# Patient Record
Sex: FEMALE | Race: BLACK OR AFRICAN AMERICAN | NOT HISPANIC OR LATINO | URBAN - METROPOLITAN AREA
[De-identification: names, ages, dates, MRNs, and addresses within clinical notes are randomized per-mention and may not be internally consistent; named-entity substitution may affect disease eponyms.]

---

## 2017-01-24 ENCOUNTER — HOSPITAL ENCOUNTER (OUTPATIENT)
Dept: CT IMAGING | Facility: CLINIC | Age: 77
Discharge: HOME | End: 2017-01-24
Attending: FAMILY MEDICINE

## 2017-01-24 ENCOUNTER — OFFICE VISIT - HEALTHEAST (OUTPATIENT)
Dept: FAMILY MEDICINE | Facility: CLINIC | Age: 77
End: 2017-01-24

## 2017-01-24 DIAGNOSIS — R10.32 ABDOMINAL PAIN, LLQ: ICD-10-CM

## 2017-01-24 DIAGNOSIS — N20.0 NEPHROLITHIASIS: ICD-10-CM

## 2017-01-26 ENCOUNTER — OFFICE VISIT - HEALTHEAST (OUTPATIENT)
Dept: UROLOGY | Facility: CLINIC | Age: 77
End: 2017-01-26

## 2017-01-26 DIAGNOSIS — N20.1 CALCULUS OF URETER: ICD-10-CM

## 2017-01-26 DIAGNOSIS — N20.9 URINARY CALCULUS, UNSPECIFIED: ICD-10-CM

## 2017-01-26 DIAGNOSIS — N20.0 CALCULUS OF KIDNEY: ICD-10-CM

## 2017-02-03 ENCOUNTER — OFFICE VISIT - HEALTHEAST (OUTPATIENT)
Dept: UROLOGY | Facility: CLINIC | Age: 77
End: 2017-02-03

## 2017-02-03 ENCOUNTER — HOSPITAL ENCOUNTER (OUTPATIENT)
Dept: CT IMAGING | Facility: CLINIC | Age: 77
Discharge: HOME | End: 2017-02-03
Attending: PHYSICIAN ASSISTANT

## 2017-02-03 DIAGNOSIS — N20.1 CALCULUS OF URETER: ICD-10-CM

## 2017-02-03 DIAGNOSIS — N20.9 URINARY CALCULUS, UNSPECIFIED: ICD-10-CM

## 2017-02-03 DIAGNOSIS — N20.0 CALCULUS OF KIDNEY: ICD-10-CM

## 2017-02-10 ENCOUNTER — ANESTHESIA - HEALTHEAST (OUTPATIENT)
Dept: SURGERY | Facility: CLINIC | Age: 77
End: 2017-02-10

## 2017-02-10 ENCOUNTER — SURGERY - HEALTHEAST (OUTPATIENT)
Dept: SURGERY | Facility: CLINIC | Age: 77
End: 2017-02-10

## 2017-02-10 ASSESSMENT — MIFFLIN-ST. JEOR: SCORE: 1090.32

## 2017-02-16 ENCOUNTER — AMBULATORY - HEALTHEAST (OUTPATIENT)
Dept: UROLOGY | Facility: CLINIC | Age: 77
End: 2017-02-16

## 2017-02-16 DIAGNOSIS — N20.9 URINARY CALCULUS, UNSPECIFIED: ICD-10-CM

## 2017-02-16 DIAGNOSIS — N20.0 CALCULUS OF KIDNEY: ICD-10-CM

## 2017-02-16 DIAGNOSIS — N20.1 CALCULUS OF URETER: ICD-10-CM

## 2017-02-17 ENCOUNTER — HOSPITAL ENCOUNTER (OUTPATIENT)
Dept: ADMINISTRATIVE | Facility: OTHER | Age: 77
Discharge: HOME | End: 2017-02-17

## 2017-02-20 ENCOUNTER — COMMUNICATION - HEALTHEAST (OUTPATIENT)
Dept: UROLOGY | Facility: CLINIC | Age: 77
End: 2017-02-20

## 2017-02-20 DIAGNOSIS — B95.2 UTI (URINARY TRACT INFECTION) DUE TO ENTEROCOCCUS: ICD-10-CM

## 2017-02-20 DIAGNOSIS — N39.0 UTI (URINARY TRACT INFECTION) DUE TO ENTEROCOCCUS: ICD-10-CM

## 2017-02-24 ENCOUNTER — COMMUNICATION - HEALTHEAST (OUTPATIENT)
Dept: UROLOGY | Facility: CLINIC | Age: 77
End: 2017-02-24

## 2017-02-24 DIAGNOSIS — B95.2 UTI (URINARY TRACT INFECTION) DUE TO ENTEROCOCCUS: ICD-10-CM

## 2017-02-24 DIAGNOSIS — N39.0 UTI (URINARY TRACT INFECTION) DUE TO ENTEROCOCCUS: ICD-10-CM

## 2017-06-30 ENCOUNTER — HOME CARE/HOSPICE - HEALTHEAST (OUTPATIENT)
Dept: HOME HEALTH SERVICES | Facility: HOME HEALTH | Age: 77
End: 2017-06-30

## 2017-07-04 ENCOUNTER — COMMUNICATION - HEALTHEAST (OUTPATIENT)
Dept: HOME HEALTH SERVICES | Facility: HOME HEALTH | Age: 77
End: 2017-07-04

## 2017-07-06 ENCOUNTER — HOME CARE/HOSPICE - HEALTHEAST (OUTPATIENT)
Dept: HOME HEALTH SERVICES | Facility: HOME HEALTH | Age: 77
End: 2017-07-06

## 2017-07-06 ENCOUNTER — COMMUNICATION - HEALTHEAST (OUTPATIENT)
Dept: FAMILY MEDICINE | Facility: CLINIC | Age: 77
End: 2017-07-06

## 2017-07-07 ENCOUNTER — OFFICE VISIT - HEALTHEAST (OUTPATIENT)
Dept: FAMILY MEDICINE | Facility: CLINIC | Age: 77
End: 2017-07-07

## 2017-07-07 DIAGNOSIS — I63.511 CEREBRAL INFARCTION INVOLVING RIGHT MIDDLE CEREBRAL ARTERY (H): ICD-10-CM

## 2017-07-08 ENCOUNTER — HOME CARE/HOSPICE - HEALTHEAST (OUTPATIENT)
Dept: HOME HEALTH SERVICES | Facility: HOME HEALTH | Age: 77
End: 2017-07-08

## 2017-07-10 ENCOUNTER — COMMUNICATION - HEALTHEAST (OUTPATIENT)
Dept: HOME HEALTH SERVICES | Facility: HOME HEALTH | Age: 77
End: 2017-07-10

## 2017-07-11 ENCOUNTER — HOME CARE/HOSPICE - HEALTHEAST (OUTPATIENT)
Dept: HOME HEALTH SERVICES | Facility: HOME HEALTH | Age: 77
End: 2017-07-11

## 2017-07-14 ENCOUNTER — HOME CARE/HOSPICE - HEALTHEAST (OUTPATIENT)
Dept: HOME HEALTH SERVICES | Facility: HOME HEALTH | Age: 77
End: 2017-07-14

## 2017-07-18 ENCOUNTER — HOME CARE/HOSPICE - HEALTHEAST (OUTPATIENT)
Dept: HOME HEALTH SERVICES | Facility: HOME HEALTH | Age: 77
End: 2017-07-18

## 2017-07-21 ENCOUNTER — HOME CARE/HOSPICE - HEALTHEAST (OUTPATIENT)
Dept: HOME HEALTH SERVICES | Facility: HOME HEALTH | Age: 77
End: 2017-07-21

## 2017-07-21 ENCOUNTER — COMMUNICATION - HEALTHEAST (OUTPATIENT)
Dept: FAMILY MEDICINE | Facility: CLINIC | Age: 77
End: 2017-07-21

## 2017-07-21 DIAGNOSIS — I63.511 CEREBRAL INFARCTION INVOLVING RIGHT MIDDLE CEREBRAL ARTERY (H): ICD-10-CM

## 2017-07-25 ENCOUNTER — RECORDS - HEALTHEAST (OUTPATIENT)
Dept: ADMINISTRATIVE | Facility: OTHER | Age: 77
End: 2017-07-25

## 2017-08-04 ENCOUNTER — COMMUNICATION - HEALTHEAST (OUTPATIENT)
Dept: OBGYN | Facility: HOSPITAL | Age: 77
End: 2017-08-04

## 2017-08-04 DIAGNOSIS — I63.511 CEREBRAL INFARCTION INVOLVING RIGHT MIDDLE CEREBRAL ARTERY (H): ICD-10-CM

## 2017-08-11 ENCOUNTER — OFFICE VISIT - HEALTHEAST (OUTPATIENT)
Dept: FAMILY MEDICINE | Facility: CLINIC | Age: 77
End: 2017-08-11

## 2017-08-11 ENCOUNTER — RECORDS - HEALTHEAST (OUTPATIENT)
Dept: GENERAL RADIOLOGY | Facility: CLINIC | Age: 77
End: 2017-08-11

## 2017-08-11 ENCOUNTER — COMMUNICATION - HEALTHEAST (OUTPATIENT)
Dept: FAMILY MEDICINE | Facility: CLINIC | Age: 77
End: 2017-08-11

## 2017-08-11 DIAGNOSIS — M54.2 CERVICALGIA: ICD-10-CM

## 2017-08-11 DIAGNOSIS — M19.90 UNSPECIFIED OSTEOARTHRITIS, UNSPECIFIED SITE: ICD-10-CM

## 2017-08-11 DIAGNOSIS — M54.2 CERVICAL PAIN: ICD-10-CM

## 2017-08-11 DIAGNOSIS — M16.10 HIP ARTHRITIS: ICD-10-CM

## 2017-08-11 DIAGNOSIS — F32.9 REACTIVE DEPRESSION: ICD-10-CM

## 2017-08-11 RX ORDER — TRAMADOL HYDROCHLORIDE 50 MG/1
50 TABLET ORAL 2 TIMES DAILY PRN
Qty: 60 TABLET | Refills: 0 | Status: SHIPPED | OUTPATIENT
Start: 2017-08-11

## 2017-08-15 ENCOUNTER — COMMUNICATION - HEALTHEAST (OUTPATIENT)
Dept: FAMILY MEDICINE | Facility: CLINIC | Age: 77
End: 2017-08-15

## 2017-08-30 ENCOUNTER — OFFICE VISIT - HEALTHEAST (OUTPATIENT)
Dept: FAMILY MEDICINE | Facility: CLINIC | Age: 77
End: 2017-08-30

## 2017-08-30 DIAGNOSIS — F51.01 PRIMARY INSOMNIA: ICD-10-CM

## 2017-08-30 RX ORDER — LANOLIN ALCOHOL/MO/W.PET/CERES
3 CREAM (GRAM) TOPICAL
Qty: 30 TABLET | Refills: 1 | Status: SHIPPED | COMMUNITY
Start: 2017-08-30

## 2017-08-30 ASSESSMENT — MIFFLIN-ST. JEOR: SCORE: 1095.03

## 2017-11-08 ENCOUNTER — COMMUNICATION - HEALTHEAST (OUTPATIENT)
Dept: FAMILY MEDICINE | Facility: CLINIC | Age: 77
End: 2017-11-08

## 2017-11-08 DIAGNOSIS — I63.511 CEREBRAL INFARCTION INVOLVING RIGHT MIDDLE CEREBRAL ARTERY (H): ICD-10-CM

## 2017-11-08 RX ORDER — SIMVASTATIN 40 MG
40 TABLET ORAL AT BEDTIME
Qty: 90 TABLET | Refills: 0 | Status: SHIPPED | OUTPATIENT
Start: 2017-11-08

## 2017-11-30 ENCOUNTER — COMMUNICATION - HEALTHEAST (OUTPATIENT)
Dept: FAMILY MEDICINE | Facility: CLINIC | Age: 77
End: 2017-11-30

## 2017-11-30 DIAGNOSIS — F32.9 REACTIVE DEPRESSION: ICD-10-CM

## 2017-11-30 DIAGNOSIS — M54.2 CERVICAL PAIN: ICD-10-CM

## 2017-12-02 RX ORDER — DULOXETIN HYDROCHLORIDE 30 MG/1
30 CAPSULE, DELAYED RELEASE ORAL DAILY
Qty: 30 CAPSULE | Refills: 3 | Status: SHIPPED | OUTPATIENT
Start: 2017-12-02

## 2018-01-04 ENCOUNTER — COMMUNICATION - HEALTHEAST (OUTPATIENT)
Dept: OBGYN | Facility: HOSPITAL | Age: 78
End: 2018-01-04

## 2018-01-04 DIAGNOSIS — I63.511 CEREBRAL INFARCTION INVOLVING RIGHT MIDDLE CEREBRAL ARTERY (H): ICD-10-CM

## 2018-01-04 RX ORDER — LISINOPRIL 5 MG/1
TABLET ORAL
Qty: 30 TABLET | Refills: 7 | Status: SHIPPED | OUTPATIENT
Start: 2018-01-04

## 2021-05-30 VITALS — HEIGHT: 62 IN | BODY MASS INDEX: 27.06 KG/M2 | WEIGHT: 147.06 LBS

## 2021-05-30 VITALS — BODY MASS INDEX: 26.94 KG/M2 | WEIGHT: 147.3 LBS

## 2021-05-31 VITALS — HEIGHT: 61 IN | BODY MASS INDEX: 28.62 KG/M2 | WEIGHT: 151.6 LBS

## 2021-05-31 VITALS — BODY MASS INDEX: 29.57 KG/M2 | WEIGHT: 151.4 LBS

## 2021-05-31 VITALS — BODY MASS INDEX: 29.04 KG/M2 | WEIGHT: 148.7 LBS

## 2021-06-08 NOTE — ANESTHESIA POSTPROCEDURE EVALUATION
Patient: Sandy Pennington  CYSTOSCOPY, BILATERAL URETEROSCOPY, BILATERAL URETERAL STENT INSERTION  Anesthesia type: general    Patient location: Phase II Recovery  Last vitals:   Vitals:    02/10/17 1100   BP: (!) 190/74   Pulse: 74   Resp: 16   Temp:    SpO2: 95%     Post vital signs: stable  Level of consciousness: awake, alert and oriented  Post-anesthesia pain: pain controlled  Post-anesthesia nausea and vomiting: no  Pulmonary: unassisted, return to baseline  Cardiovascular: stable and blood pressure at baseline  Hydration: adequate  Anesthetic events: no    QCDR Measures:  ASA# 11 - Elenita-op Cardiac Arrest: ASA11B - Patient did NOT experience unanticipated cardiac arrest  ASA# 12 - Elenita-op Mortality Rate: ASA12B - Patient did NOT die  ASA# 13 - PACU Re-Intubation Rate: ASA13B - Patient did NOT require a new airway mgmt  ASA# 10 - Composite Anes Safety: ASA10A - No serious adverse event  ASA# 38 - New Corneal Injury: ASA38A - No new exposure keratitis or corneal abrasion in PACU    Additional Notes:

## 2021-06-08 NOTE — PROGRESS NOTES
Patient educated regarding stent removal procedure and possible symptoms after removal.  Patient voiced understanding of information.  Handout given to patient.  Consent form signed.  Daughter is the .

## 2021-06-08 NOTE — ANESTHESIA CARE TRANSFER NOTE
Last vitals:   Vitals:    02/10/17 0946   BP: 136/60   Pulse: 90   Resp: 16   Temp: 37  C (98.6  F)   SpO2: 100%     Patient's level of consciousness is drowsy  Spontaneous respirations: yes  Maintains airway independently: yes  Dentition unchanged: yes  Oropharynx: oral airway in place    QCDR Measures:  ASA# 20 - Surgical Safety Checklist: ASA20A - Safety Checks Done  PQRS# 430 - Adult PONV Prevention: 4558F - Pt received => 2 anti-emetic agents (different classes) preop & intraop  ASA# 8 - Peds PONV Prevention: NA - Not pediatric patient, not GA or 2 or more risk factors NOT present  PQRS# 424 - Elenita-op Temp Management: 4559F - At least one body temp DOCUMENTED => 35.5C or 95.9F within required timeframe  PQRS# 426 - PACU Transfer Protocol: - Transfer of care checklist used  ASA# 14 - Acute Post-op Pain: ASA14B - Patient did NOT experience pain >= 7 out of 10    I completed my SBAR handoff to the receiving nurse per policy and procedure.

## 2021-06-08 NOTE — PROGRESS NOTES
Assessment/Plan:        Diagnoses and all orders for this visit:    Urinary calculus, unspecified  -     POCT urinalysis dipstick-[facility]  -     Ureteroscopy Education  -     Place sequential compression device; Standing  -     XR Abdomen AP; Standing  -     levofloxacin 500 mg/100 mL IVPB 500 mg (LEVAQUIN); Infuse 100 mL (500 mg total) into a venous catheter 60 minutes before surgery or procedure (On Call to OR).    Calculus of ureter  -     tamsulosin (FLOMAX) 0.4 mg Cp24; Take 1 capsule (0.4 mg total) by mouth daily for 14 days.  Dispense: 14 capsule; Refill: 1  -     Ureteroscopy Education  -     Place sequential compression device; Standing  -     XR Abdomen AP; Standing  -     levofloxacin 500 mg/100 mL IVPB 500 mg (LEVAQUIN); Infuse 100 mL (500 mg total) into a venous catheter 60 minutes before surgery or procedure (On Call to OR).    Calculus of kidney  -     Ureteroscopy Education  -     Place sequential compression device; Standing  -     XR Abdomen AP; Standing  -     levofloxacin 500 mg/100 mL IVPB 500 mg (LEVAQUIN); Infuse 100 mL (500 mg total) into a venous catheter 60 minutes before surgery or procedure (On Call to OR).      Stone Management Plan  [facility] 1/26/2017 2/3/2017   Urinary Tract Infection No suspicion of infection No suspicion of infection   Renal Colic Well controlled symptoms Asymptomatic at this time   Renal Failure No suspicion of renal failure No suspicion of renal failure   Current CT date 1/24/2017 2/3/2017   Right sided stones? Yes Yes   R Number of ureteral stones 1 1   R GSD of ureteral stones 5 5   R Location of ureteral stone Distal Distal   R Number of kidney stones  No renal stones 3   R GSD of kidney stones N/A < 2   R Hydronephrosis None None   R Stone Event New event Established event   Diagnosis date 1/24/2017 -   Initial location of primary symptomatic stone Distal -   Initial GSD of primary symptomatic stone 5 -   R MET status Initiation No progression    R Current Plan MET Clear   MET 1 week F/U -   Clear rationale - Poor prognosis   Left sided stones? Yes Yes   L Number of ureteral stones No ureteral stones No ureteral stones   L Number of kidney stones  3 3   L GSD of kidney stones 4 - 10 4 - 10   L Hydronephrosis None None   L Stone Event No current event No current event   L Current Plan Observe Clear   Clear rationale - Optimally managed electively   Observe rationale Significant stone burden amenable to emergency management -             Subjective:      HPI  Ms. Sandy Pennington is a 76 y.o.  female from [place] returning to the [facility] [place] for follow-up of right distal ureteral stone with known 31-2 mm stones in the right kidney and 3 large stones in the left kidney measuring 3 mm 4 mm and 6 mm.  Patient has been completely asymptomatic since seen 1/26/2017.  Specifically no voiding symptoms of urgency or frequency or hematuria no abdominal or flank pain.    Her original CT was done January 24, 2017.  [place] numbers of large stones were 936.    Personal review of CT scan obtained today to 3/2/ 2017 demonstrates a 5.3 x 4.7 calculus in the region of the right ureteral meatus versus an bladder but absolutely unchanged from previous location.  Upper tract stones right and left are unchanged from CT of 1/24/2017.  There is no hydroureteronephrosis.    Impression right distal ureteral stone versus stone and bladder with bilateral renal stones    Plan recommended cystoscopy with anesthesia with cystolitholapaxy of stone if in bladder.  If stone is in the right distal ureter proceed with ureteroscopy laser lithotripsy removal of stone and then consideration for clearance of upper tract stones with cystoscopy retrograde ureteroscopy stone removal  stent placement.  Consideration could then be given to clearance on the left with cystoscopy left retrograde ureteroscopy laser lithotripsy stone removal with stent  placement.  If the right sided distal stone is actually in the bladder clearance of the stone will be done and then consideration made for clearance of stones on the left initially and if all went well proceeded on the right.  This would be at the discretion of the  Dr. Epps.    Risks and benefits were detailed and ureteroscopic stone clearance including potential issues of urinary or systemic infection ureteral injury and accessible stone incomplete stone clearance multiple surgeries and stent related symptoms of flank pain and bladder pain urgency frequency and hematuria.  Patient verbalized understanding and agreed with plan as discussed.    She will remain on her Flomax at this point with surgery scheduled for February 10, 2017.    Patient has available to her Flomax, Percocet ibuprofen and Dramamine for symptom relief postoperatively.  We discussed briefly consideration for stone risk evaluation upon resolution of her acute problem.    Greater than 40 minutes spent with patient and her  discussing evaluation and recommendation greater than 50% of the time spent counseling patient     ROS   A 12 point comprehensive review of systems is negative except for HPI.    Past Medical History:   Diagnosis Date     Arthritis      GERD (gastroesophageal reflux disease)      Kidney stone        Past Surgical History:   Procedure Laterality Date     APPENDECTOMY         Current Outpatient Prescriptions   Medication Sig Dispense Refill     acetaminophen (TYLENOL) 325 MG tablet Take 650 mg by mouth every 6 (six) hours as needed for pain.       acetaminophen-codeine (TYLENOL #3) 300-30 mg per tablet Take 1 tablet by mouth every 4 (four) hours as needed for pain. 30 tablet 0     ibuprofen (ADVIL,MOTRIN) 200 MG tablet Take 200 mg by mouth every 6 (six) hours as needed for pain.       omeprazole (PRILOSEC) 20 MG capsule Take 1 capsule (20 mg total) by mouth Daily before breakfast. 30 capsule 0      oxyCODONE-acetaminophen (PERCOCET) 5-325 mg per tablet 1 tab po q4hrs prn pain 20 tablet 0     ranitidine (ZANTAC) 300 MG tablet Take 1 tablet (300 mg total) by mouth bedtime. 30 tablet 2     senna-docusate (SENNOSIDES-DOCUSATE SODIUM) 8.6-50 mg tablet Take 1 tablet by mouth daily. 20 tablet 0     tamsulosin (FLOMAX) 0.4 mg Cp24 Take 1 capsule (0.4 mg total) by mouth daily for 14 days. 14 capsule 1     No current facility-administered medications for this visit.        No Known Allergies    Social History     Social History     Marital status:      Spouse name: N/A     Number of children: N/A     Years of education: N/A     Occupational History     Retired      Social History Main Topics     Smoking status: Never Smoker     Smokeless tobacco: Never Used     Alcohol use No     Drug use: No     Sexual activity: Not Currently     Other Topics Concern     Not on file     Social History Narrative       Family History   Problem Relation Age of Onset     Urolithiasis Neg Hx      Clotting disorder Neg Hx      Diabetes Neg Hx      Gout Neg Hx      Cancer Neg Hx      Heart disease Neg Hx      Objective:      Physical Exam  Vitals:    02/03/17 1447   BP: 177/79   Pulse: 77   Temp:      General - well developed, well nourished, appropriate for age. Appears no distress at this time   Heart - regular rate and rhythm, no murmur  Respiratory - normal effort, clear to auscultation, good air entry without adventitious noises  Abdomen - mildly obese soft, non-tender, no hepatosplenomegaly, no masses.   - no flank tenderness, no suprapubic tenderness, kidney and bladder non-palpable  MSK - normal spinal curvature. no spinal tenderness. normal gait. muscular strength intact.  Neurology - cranial nerves II-XII grossly intact, normal sensation, no unsteadiness  Skin - intact, no bruising, no gouty tophi  Psych - oriented to time, place, and person, normal mood and affect.      Labs   Urinalysis POC (Office):  BLOOD UA   Date  Value Ref Range Status   02/03/2017 Trace Negative Final     NITRITE, UA   Date Value Ref Range Status   02/03/2017 Negative Negative Final   01/09/2017 Negative Negative Final   06/13/2016 Negative Negative Final     LEUKOCYTES, UA    Date Value Ref Range Status   02/03/2017 Negative Negative Final     PH UA   Date Value Ref Range Status   02/03/2017 6.5 4.5 - 8.0 Final       Lab Urinalysis:  BLOOD, UA   Date Value Ref Range Status   01/09/2017 Negative Negative Final   06/13/2016 Trace (!) Negative Final     NITRITE, UA   Date Value Ref Range Status   02/03/2017 Negative Negative Final   01/09/2017 Negative Negative Final   06/13/2016 Negative Negative Final     LEUKOCYTES, UA   Date Value Ref Range Status   01/09/2017 Small (!) Negative Final   06/13/2016 Trace (!) Negative Final     PH, UA   Date Value Ref Range Status   01/09/2017 7.0 4.5 - 8.0 Final   06/13/2016 5.5 4.5 - 8.0 Final

## 2021-06-08 NOTE — PROGRESS NOTES
ASSESSMENT:  1. Abdominal pain, LLQ  - CT Abdomen Pelvis Without Oral Without IV Contrast; Future  - acetaminophen-codeine (TYLENOL #3) 300-30 mg per tablet; Take 1 tablet by mouth every 4 (four) hours as needed for pain.  Dispense: 30 tablet; Refill: 0  - omeprazole (PRILOSEC) 20 MG capsule; Take 1 capsule (20 mg total) by mouth Daily before breakfast.  Dispense: 30 capsule; Refill: 0  - senna-docusate (SENNOSIDES-DOCUSATE SODIUM) 8.6-50 mg tablet; Take 1 tablet by mouth daily.  Dispense: 20 tablet; Refill: 0    2. Nephrolithiasis  - Ambulatory referral to Kidney Stone    PLAN:  There are no Patient Instructions on file for this visit.    Orders Placed This Encounter   Procedures     CT Abdomen Pelvis Without Oral Without IV Contrast     Standing Status:   Future     Number of Occurrences:   1     Standing Expiration Date:   1/24/2018     Order Specific Question:   Reason for Exam (Describe Symptoms):     Answer:   Right Lower Quadrant Abdominal pain     Order Specific Question:   Can the procedure be changed per Radiologist protocol?     Answer:   Yes     Order Specific Question:   If this is a diagnostic procedure, have the patient's age and recent imaging history been considered?     Answer:   Yes     Ambulatory referral to Kidney Stone     Referral Priority:   Routine     Referral Type:   Consultation     Referral Reason:   Evaluation and Treatment     Requested Specialty:   Urology     Number of Visits Requested:   1     There are no discontinued medications.    No Follow-up on file.     Prescribed Tylenol #3 and senna-docusate for pain and constipation. Prescribed omeprazole to take in the morning, and advised patient to take ranitidine at night as needed. Advised patient not to take ibuprofen. Encouraged patient to eat 3 times per day and to drink enough water. Patient should include fruits and vegetables in her diet. Ordered an Abdomen Pelvic CT to evaluate abdominal pain as well.     CHIEF  COMPLAINT:  Chief Complaint   Patient presents with     Pain     all over body pain, started yesterday, had gone to ER for Chest pain (which is better now)       HISTORY OF PRESENT ILLNESS:  Sandy is a 76 y.o. female presenting to the clinic today with her daughter for evaluation of pain. She has had pain all over her body for about 1 month. The pain comes and goes. She has pain at her head, neck, chest, back, stomach, and groin pain that goes down her right leg. She has daily bowel movements. She has constipation at times and diarrhea at times. She has not vomited. She does not eat or drink much. She does not feel worried. She used to go for walks during the summer according to her daughter, but she has not been going for walks since the weather became colder. Of note, she presented to the [facility] ER on January 9 with headache, chest pain, and shortness of breath, and the symptoms have since improved.     REVIEW OF SYSTEMS:   She has a cough that is not productive. All other systems are negative.    PFSH:  Social: She has been in [place] for 1 year and 2 months.     Reviewed, as below.     TOBACCO USE:  History   Smoking Status     Never Smoker   Smokeless Tobacco     Never Used       VITALS:  Vitals:    01/24/17 1422   BP: 138/72   Patient Site: Left Arm   Patient Position: Sitting   Cuff Size: Adult Regular   Pulse: 76   Temp: 98.2  F (36.8  C)   TempSrc: Oral   Weight: 147 lb 4.8 oz (66.8 kg)     Wt Readings from Last 3 Encounters:   01/24/17 147 lb 4.8 oz (66.8 kg)   01/09/17 160 lb (72.6 kg)   09/30/16 142 lb 4.8 oz (64.5 kg)     Body mass index is 26.94 kg/(m^2).    PHYSICAL EXAM:  General Appearance: Alert, cooperative, no distress, appears stated age. Looks a little downcast. Afebrile.   HEENT: Pupils equal, symmetric  Lungs: Clear to auscultation bilaterally, respirations unlabored  Heart: Regular rate and rhythm, S1 and S2 normal, no murmur, rub, or gallop  Abdomen: Soft, bowel sounds active all  "four quadrants, no masses, no organomegaly. Diffuse abdominal pain noted more around the flanks. Right lower abdominal discomfort, more around the right pelvic region. Also some left upper quadrant abdominal pain.  Neurologic:  Alert and oriented times 3. Normal reflexes. Cranial nerves II-XII intact.   Psychiatric: Normal mood and affect.      QUALITY MEASURES  The following high BMI interventions were performed this visit: encouragement to exercise      ADDITIONAL HISTORY SUMMARIZED (2): Reviewed 1/9/2017 note from [facility] ER regarding headache and chest pain.   DECISION TO OBTAIN EXTRA INFORMATION (1): None.   RADIOLOGY TESTS (1): Ordered Abdomen Pelvic CT. Reviewed 6/13/2016 Abdominal X-ray report, indication: Unspecified abdominal pain. Reviewed 1/9/2017 Cervical Spine CT report, indication: \"Worsening chronic headache. Neck pain.\"   LABS (1): Reviewed 1/9/2016 labs from [facility] ED including CMP.   MEDICINE TESTS (1): None.  INDEPENDENT REVIEW (2 each): None.     The visit lasted a total of 32 minutes face to face with the patient. Over 50% of the time was spent counseling and educating the patient about pain. An additional 1 minute was spent on exercise encouragement.     IWaqas, am scribing for and in the presence of, Dr. Lundberg.    I, Dr. Lundberg, personally performed the services described in this documentation, as scribed by Waqas Lopez in my presence, and it is both accurate and complete.    MEDICATIONS:  Current Outpatient Prescriptions   Medication Sig Dispense Refill     ranitidine (ZANTAC) 300 MG tablet Take 1 tablet (300 mg total) by mouth bedtime. 30 tablet 2     acetaminophen (TYLENOL) 325 MG tablet Take 650 mg by mouth every 6 (six) hours as needed for pain.       acetaminophen-codeine (TYLENOL #3) 300-30 mg per tablet Take 1 tablet by mouth every 4 (four) hours as needed for pain. 30 tablet 0     ibuprofen (ADVIL,MOTRIN) 200 MG tablet Take 200 mg by mouth every 6 (six) hours as " needed for pain.       omeprazole (PRILOSEC) 20 MG capsule Take 1 capsule (20 mg total) by mouth Daily before breakfast. 30 capsule 0     oxyCODONE-acetaminophen (PERCOCET) 5-325 mg per tablet 1 tab po q4hrs prn pain 20 tablet 0     senna-docusate (SENNOSIDES-DOCUSATE SODIUM) 8.6-50 mg tablet Take 1 tablet by mouth daily. 20 tablet 0     No current facility-administered medications for this visit.        Total data points: 4

## 2021-06-08 NOTE — PROGRESS NOTES
Assessment/Plan:        Diagnoses and all orders for this visit:    Calculus of ureter  -     Cystoscopy with Stent Removal Education  -     oxyCODONE (ROXICODONE) 5 MG immediate release tablet; 1-2 tablets every four hours as required for severe pain  Dispense: 30 tablet; Refill: 0    Urinary calculus, unspecified  -     POCT urinalysis dipstick-[facility]  -     Culture, Urine  -     ciprofloxacin HCl tablet 250 mg (CIPRO); Take 1 tablet (250 mg total) by mouth once.    Calculus of kidney  -     Cystoscopy with Stent Removal Education  -     CT Abdomen Pelvis Without Oral Without IV Contrast; Future; Expected date: 3/18/17  -     oxyCODONE (ROXICODONE) 5 MG immediate release tablet; 1-2 tablets every four hours as required for severe pain  Dispense: 30 tablet; Refill: 0    Other orders  -     ciprofloxacin HCl (CIPRO) 250 MG tablet;       Stone Management Plan  [facility] 1/26/2017 2/3/2017 2/16/2017   Urinary Tract Infection No suspicion of infection No suspicion of infection No suspicion of infection   Renal Colic Well controlled symptoms Asymptomatic at this time Well controlled symptoms   Renal Failure No suspicion of renal failure No suspicion of renal failure No suspicion of renal failure   Current CT date 1/24/2017 2/3/2017 -   Right sided stones? Yes Yes -   R Number of ureteral stones 1 1 -   R GSD of ureteral stones 5 5 -   R Location of ureteral stone Distal Distal -   R Number of kidney stones  No renal stones 3 -   R GSD of kidney stones N/A < 2 -   R Hydronephrosis None None -   R Stone Event New event Established event Established event   Diagnosis date 1/24/2017 - -   Initial location of primary symptomatic stone Distal - -   Initial GSD of primary symptomatic stone 5 - -   R Post-op status - - Stent Removal   R MET status Initiation No progression -   R Current Plan MET Clear -   MET 1 week F/U - -   Clear rationale - Poor prognosis -   Left sided stones? Yes Yes -   L Number of ureteral  stones No ureteral stones No ureteral stones -   L Number of kidney stones  3 3 -   L GSD of kidney stones 4 - 10 4 - 10 -   L Hydronephrosis None None -   L Stone Event No current event No current event Established event   Post-op status - - Stent Removal   L Current Plan Observe Clear -   Clear rationale - Optimally managed electively -   Observe rationale Significant stone burden amenable to emergency management - -             Subjective:      HPI  Ms. Sandy Pennington is a 76 y.o.  female returning to the [facility] [place] for early postoperative follow up for anticipated stent removal.     She returns status post bilateral ureteroscopic laser lithotripsy for renal and ureteral stone. She has had no unanticipated post-operative events.    She has had no symptoms suspicious for infection and stent was moderately symptomatic with typical issues of flank discomfort and lower urinary tract irritation.     Flexible cystoscopy is performed and indwelling stent is removed without incident.    She will follow up in the office in one month with imaging.       ROS   Review of systems is negative except for HPI.    Past Medical History:   Diagnosis Date     Arthritis      GERD (gastroesophageal reflux disease)      Kidney stone        Past Surgical History:   Procedure Laterality Date     APPENDECTOMY       CATARACT EXTRACTION, BILATERAL       CYSTOSCOPY W/ URETERAL STENT PLACEMENT Bilateral 2/10/2017    Procedure: CYSTOSCOPY, BILATERAL URETEROSCOPY, BILATERAL URETERAL STENT INSERTION;  Surgeon: Nitin Epps MD;  Location: [facility];  Service:        Current Outpatient Prescriptions   Medication Sig Dispense Refill     acetaminophen-codeine (TYLENOL #3) 300-30 mg per tablet Take 1 tablet by mouth every 4 (four) hours as needed for pain.       omeprazole (PRILOSEC) 20 MG capsule Take 1 capsule (20 mg total) by mouth Daily before breakfast. 30 capsule 0      oxyCODONE-acetaminophen (PERCOCET) 5-325 mg per tablet 1 tab po q4hrs prn pain 20 tablet 0     ranitidine (ZANTAC) 300 MG tablet Take 1 tablet (300 mg total) by mouth bedtime. 30 tablet 2     senna-docusate (SENNOSIDES-DOCUSATE SODIUM) 8.6-50 mg tablet Take 1 tablet by mouth daily. 20 tablet 0     tamsulosin (FLOMAX) 0.4 mg Cp24 Take 1 capsule (0.4 mg total) by mouth daily for 14 days. 14 capsule 1     acetaminophen (TYLENOL) 325 MG tablet Take 650 mg by mouth every 6 (six) hours as needed for pain.       oxyCODONE (ROXICODONE) 5 MG immediate release tablet 1-2 tablets every four hours as required for severe pain 30 tablet 0     No current facility-administered medications for this visit.        No Known Allergies    Social History     Social History     Marital status:      Spouse name: N/A     Number of children: N/A     Years of education: N/A     Occupational History     Retired      Social History Main Topics     Smoking status: Never Smoker     Smokeless tobacco: Never Used     Alcohol use No     Drug use: No     Sexual activity: Not Currently     Other Topics Concern     Not on file     Social History Narrative       Family History   Problem Relation Age of Onset     Urolithiasis Neg Hx      Clotting disorder Neg Hx      Diabetes Neg Hx      Gout Neg Hx      Cancer Neg Hx      Heart disease Neg Hx      Objective:      Physical Exam  Vitals:    02/16/17 1521   BP: (!) 145/101   Pulse: 76   Temp: 98.1  F (36.7  C)     General - well developed, well nourished, appropriate for age. Appears no distress at this time  Abdomen - moderately obese soft, non-tender, no hepatosplenomegaly, no masses.   - no flank tenderness, no suprapubic tenderness, kidney and bladder non-palpable  MSK - normal spinal curvature. no spinal tenderness. normal gait. muscular strength intact.  Psych - oriented to time, place, and person, normal mood and affect.      Labs   Urinalysis POC (Office):  BLOOD UA   Date Value Ref Range  Status   02/16/2017 Small Negative Final   02/03/2017 Trace Negative Final     NITRITE, UA   Date Value Ref Range Status   02/16/2017 Negative Negative Final   02/03/2017 Negative Negative Final   01/09/2017 Negative Negative Final   06/13/2016 Negative Negative Final     LEUKOCYTES, UA    Date Value Ref Range Status   02/16/2017 Small (!) Negative Final   02/03/2017 Negative Negative Final     PH UA   Date Value Ref Range Status   02/16/2017 6.5 4.5 - 8.0 Final   02/03/2017 6.5 4.5 - 8.0 Final       Lab Urinalysis:  BLOOD, UA   Date Value Ref Range Status   01/09/2017 Negative Negative Final   06/13/2016 Trace (!) Negative Final     NITRITE, UA   Date Value Ref Range Status   02/16/2017 Negative Negative Final   02/03/2017 Negative Negative Final   01/09/2017 Negative Negative Final   06/13/2016 Negative Negative Final     LEUKOCYTES, UA   Date Value Ref Range Status   01/09/2017 Small (!) Negative Final   06/13/2016 Trace (!) Negative Final     PH, UA   Date Value Ref Range Status   01/09/2017 7.0 4.5 - 8.0 Final   06/13/2016 5.5 4.5 - 8.0 Final

## 2021-06-08 NOTE — PROGRESS NOTES
Assessment/Plan:        Diagnoses and all orders for this visit:    Calculus of ureter  -     Symptom Control While Passing a Stone Education  -     CT Abdomen Pelvis Without Oral Without IV Contrast; Future; Expected date: 2/2/17  -     tamsulosin (FLOMAX) 0.4 mg Cp24; Take 1 capsule (0.4 mg total) by mouth daily for 14 days.  Dispense: 14 capsule; Refill: 1    Calculus of kidney    Urinary calculus, unspecified  -     POCT urinalysis dipstick-[facility]      Stone Management Plan  [facility] 1/26/2017   Urinary Tract Infection No suspicion of infection   Renal Colic Well controlled symptoms   Renal Failure No suspicion of renal failure   Current CT date 1/24/2017   Right sided stones? Yes   R Number of ureteral stones 1   R GSD of ureteral stones 5   R Location of ureteral stone Distal   R Number of kidney stones  No renal stones   R GSD of kidney stones N/A   R Hydronephrosis None   R Stone Event New event   Diagnosis date 1/24/2017   Initial location of primary symptomatic stone Distal   Initial GSD of primary symptomatic stone 5   R MET status Initiation   R Current Plan MET   MET 1 week F/U   Left sided stones? Yes   L Number of ureteral stones No ureteral stones   L Number of kidney stones  3   L GSD of kidney stones 4 - 10   L Hydronephrosis None   L Stone Event No current event   L Current Plan Observe   Observe rationale Significant stone burden amenable to emergency management         Subjective:      HPI  Ms. Sandy Pennington is a 76 y.o. [place] female presenting to the [facility] [place] after recent [facility] ED visit for urolithiasis.    She is a first time unidentified composition stone former. She has no identified modifiable stone risk factors. She has identified non-modifiable stone risks including:  multiple stones at presentation.    Patient is a poor historian with daughter serving as .    Primary symptom at presentation was acute onset, intermittent, right  flank pain x 2 weeks ago. The pain radiated to the right lower abdomen and groin. The pain has extended into the right leg. At its worst, it reached 8/10 with no associated alleviating or aggravating factors. Significant associated symptoms at presentation included:  left flank pain and nausea. She was seen in the ED 1/9/2017 for chest pain, shortness of breath and headache, with a negative cardiopulmonary workup. She had follow up at her PCP office 1/24/17 with complaint of all over body pain. A CT scan was obtained demonstrating a right distal ureteral stone and additional, bilateral renal stones. Significant current symptoms include:  5/10 right lower abdominal pain. She denies current symptoms of fever, chills, nausea, vomiting, urinary frequency and dysuria.     CT scan from 1/24/2017 is personally reviewed and demonstrates a 5 mm calculus likely within right distal ureter, near orifice. Small right renal stones, all < 2 mm. There are 3 non-obstructing left renal calculi (3 mm and 4 mm within upper pole and 6 mm mid pole).    Significant labs from presentation include no hematuria, no pyuria, negative nitrite, no bacteria, no growth on urine culture, normal WBC, normal creatinine and normal potassium.    PLAN    77 yo Finnish F with diffuse pain over last few weeks. Current, right lower abdominal pain with a non-obstructing right distal ureteral calculus. Bilateral renal calculi.    Will proceed with medical expulsive therapy. Risks and benefits were detailed of medical expulsive therapy including probability of stone passage, recurrent renal colic, and requirement of emergency medical and/or surgical care and further imaging. Patient verbalized understanding. Patient agrees with plan as discussed. She will return in 1 week with low dose CT scan.    For symptom control, she was prescribed Percocet and Flomax. Over the counter symptom control medications of ibuprofen and Dramamine were recommended.    Patient  also seen and examined by LYNNE Coulter   Review of Systems  A 12 point comprehensive review of systems is negative except for HPI    Past Medical History   Diagnosis Date     Arthritis      GERD (gastroesophageal reflux disease)      Kidney stone      Past Surgical History   Procedure Laterality Date     Appendectomy       Current Outpatient Prescriptions   Medication Sig Dispense Refill     acetaminophen (TYLENOL) 325 MG tablet Take 650 mg by mouth every 6 (six) hours as needed for pain.       omeprazole (PRILOSEC) 20 MG capsule Take 1 capsule (20 mg total) by mouth Daily before breakfast. 30 capsule 0     ranitidine (ZANTAC) 300 MG tablet Take 1 tablet (300 mg total) by mouth bedtime. 30 tablet 2     senna-docusate (SENNOSIDES-DOCUSATE SODIUM) 8.6-50 mg tablet Take 1 tablet by mouth daily. 20 tablet 0     acetaminophen-codeine (TYLENOL #3) 300-30 mg per tablet Take 1 tablet by mouth every 4 (four) hours as needed for pain. 30 tablet 0     ibuprofen (ADVIL,MOTRIN) 200 MG tablet Take 200 mg by mouth every 6 (six) hours as needed for pain.       oxyCODONE-acetaminophen (PERCOCET) 5-325 mg per tablet 1 tab po q4hrs prn pain 20 tablet 0     No current facility-administered medications for this visit.      No Known Allergies    Social History     Social History     Marital status:      Spouse name: N/A     Number of children: N/A     Years of education: N/A     Occupational History     Retired      Social History Main Topics     Smoking status: Never Smoker     Smokeless tobacco: Never Used     Alcohol use No     Drug use: No     Sexual activity: Not Currently     Other Topics Concern     Not on file     Social History Narrative       Family History   Problem Relation Age of Onset     Urolithiasis Neg Hx      Clotting disorder Neg Hx      Diabetes Neg Hx      Gout Neg Hx      Cancer Neg Hx      Heart disease Neg Hx        Objective:      Physical Exam  Vitals:    01/26/17 1528   BP: (!) 190/85    Pulse: 77   Temp: 98.4  F (36.9  C)     General - well developed, well nourished, appropriate for age. Appears no distress at this time   Heart - regular rate and rhythm, no murmur  Respiratory - normal effort, clear to auscultation, good air entry without adventitious noises  Abdomen - slender soft, mild TTP of RLQ, no hepatosplenomegaly, no masses.   - no flank tenderness, no suprapubic tenderness, kidney and bladder non-palpable  MSK - normal spinal curvature. no spinal tenderness. normal gait. muscular strength intact.  Neurology - cranial nerves II-XII grossly intact, normal sensation, no unsteadiness  Skin - intact, no bruising, no gouty tophi  Psych - oriented to time, place, and person, normal mood and affect.    Labs  Urinalysis POC (Office):  NITRITE, UA   Date Value Ref Range Status   01/09/2017 Negative Negative Final   06/13/2016 Negative Negative Final       Lab Urinalysis:  BLOOD, UA   Date Value Ref Range Status   01/09/2017 Negative Negative Final   06/13/2016 Trace (!) Negative Final     NITRITE, UA   Date Value Ref Range Status   01/09/2017 Negative Negative Final   06/13/2016 Negative Negative Final     LEUKOCYTES, UA   Date Value Ref Range Status   01/09/2017 Small (!) Negative Final   06/13/2016 Trace (!) Negative Final     PH, UA   Date Value Ref Range Status   01/09/2017 7.0 4.5 - 8.0 Final   06/13/2016 5.5 4.5 - 8.0 Final    and Acute Labs   CBC   WBC   Date Value Ref Range Status   01/09/2017 5.2 4.0 - 11.0 thou/uL Final     HEMOGLOBIN   Date Value Ref Range Status   01/09/2017 14.0 12.0 - 16.0 g/dL Final     PLATELETS   Date Value Ref Range Status   01/09/2017 161 140 - 440 thou/uL Final   , Renal Panel  [facility]  CREATININE   Date Value Ref Range Status   01/09/2017 0.88 0.60 - 1.10 mg/dL Final   06/13/2016 1.18 (H) 0.60 - 1.10 mg/dL Final     POTASSIUM   Date Value Ref Range Status   01/09/2017 4.2 3.5 - 5.0 mmol/L Final   06/13/2016 4.5 3.5 - 5.0 mmol/L Final     CALCIUM   Date Value Ref  Range Status   01/09/2017 9.5 8.5 - 10.5 mg/dL Final   06/13/2016 9.3 8.5 - 10.5 mg/dL Final    and Urine Culture    CULTURE   Date Value Ref Range Status   01/09/2017 No Growth  Final   06/13/2016 No Growth  Final

## 2021-06-08 NOTE — ANESTHESIA PREPROCEDURE EVALUATION
Anesthesia Evaluation      Patient summary reviewed   No history of anesthetic complications     Airway   Mallampati: III  Neck ROM: full   Pulmonary - negative ROS and normal exam    breath sounds clear to auscultation                         Cardiovascular - negative ROS and normal exam  Exercise tolerance: > or = 4 METS  (-) murmur  ECG reviewed  Rhythm: regular  Rate: normal,    no murmur      Neuro/Psych - negative ROS     Endo/Other       GI/Hepatic/Renal    (+) GERD intermittent,   chronic renal disease (ureteral stone),           Dental - normal exam                        Anesthesia Plan  Planned anesthetic: general LMA    ASA 2     Anesthetic plan and risks discussed with: patient and child/children (Daughter interpreting Bhutanese)  Anesthesia plan special considerations: antiemetics,   Post-op plan: routine recovery

## 2021-06-11 NOTE — PROGRESS NOTES
Hospitalization Follow up Visit:  Patient here for a follow up of Hospitalization for cerebrovascular accident involving right middle cerebral artery.  Admission Date June 29, 2017, Discharge date thought of July 2017 the patient reports Continuing but improving eyesight/vision on the left side.  Also noted to be very hesitant with movement as well as physical activity. She comes in with her daughter today for hospitalization follow-up, the major concern from the daughter is that of continuing worry, appears patient does not want to move or walk around because of vision problems.  There is also been some headaches intermittently.  Patient is complaining of right-sided shoulder pain which she has had in the past noting that it appears to be making it difficult for her to walk around.  She does not have any weaknesses to the musculoskeletal system.  She is able to talk and understand what is being said to her.  Blood pressure appears to be slightly higher and the doctor has purchased a blood pressure measure.      Do you take any herbs or supplements that were not prescribed by a doctor? no   Are you taking calcium supplements? no   Are you taking aspirin daily? yes    Past Medical History:   Diagnosis Date     Arthritis      GERD (gastroesophageal reflux disease)      Kidney stone      Past Surgical History:   Procedure Laterality Date     APPENDECTOMY       CATARACT EXTRACTION, BILATERAL       CYSTOSCOPY W/ URETERAL STENT PLACEMENT Bilateral 2/10/2017    Procedure: CYSTOSCOPY, BILATERAL URETEROSCOPY, BILATERAL URETERAL STENT INSERTION;  Surgeon: Nitni Epps MD;  Location: [facility];  Service:      Social History     Social History     Marital status:      Spouse name: N/A     Number of children: N/A     Years of education: N/A     Occupational History     Retired      Social History Main Topics     Smoking status: Never Smoker     Smokeless tobacco: Never Used     Alcohol use No     Drug use:  No     Sexual activity: Not Currently     Other Topics Concern     None     Social History Narrative     Family History   Problem Relation Age of Onset     Urolithiasis Neg Hx      Clotting disorder Neg Hx      Diabetes Neg Hx      Gout Neg Hx      Cancer Neg Hx      Heart disease Neg Hx      No Known Allergies  Current Outpatient Prescriptions   Medication Sig Dispense Refill     aspirin 325 MG tablet Take 1 tablet (325 mg total) by mouth daily. 30 tablet 3     naproxen sodium (ALEVE) 220 MG tablet Take 1 tablet (220 mg total) by mouth 2 (two) times a day as needed for mild pain (1-3) (Headache). 30 tablet 3     simvastatin (ZOCOR) 40 MG tablet Take 1 tablet (40 mg total) by mouth at bedtime. 30 tablet 3     acetaminophen (TYLENOL) 500 MG tablet Take 1 tablet (500 mg total) by mouth every 4 (four) hours as needed. 30 tablet 0     No current facility-administered medications for this visit.         Review of Systems   Constitutional: She denied having any fatigue or tiredness.  But as noted she does have some fear of falling.  HENT: Negative.   Eyes: Negative.   Respiratory: Negative.   Cardiovascular: Negative.   Gastrointestinal: Negative.   Endocrine: Negative.   Genitourinary: Negative.   Musculoskeletal: As in HPI.  Skin: Negative.   Allergic/Immunologic: Negative.   Neurological: Vision problems.    Hematological: Negative.   Psychiatric/Behavioral: Negative.     Physical Exam:  General Appearance: Alert, cooperative, no distress, appears stated age appears to not want to answer some questions but with prompting she will answer adequately and clearly.  HEENT: Supple neck, external ears are normal.  Back: Symmetric, no curvature, ROM normal, no CVA tenderness  Lungs: Clear to auscultation bilaterally, respirations unlabored  Heart: Regular rate and rhythm, S1 and S2 normal, no murmur, rub, or gallop,  Abdomen: Soft, non-tender, bowel sounds active all four quadrants,  no masses, no  organomegaly  Musculoskeletal: There is slight discomfort to the right shoulder and reduced range of motion especially abduction and flexion.  Extremities: Extremities normal, atraumatic, no cyanosis or edema  Skin: Skin color, texture, turgor normal, no rashes or lesions  Lymph nodes: Cervical, supraclavicular, and axillary nodes normal  Neurologic: She is alert and oriented.  Other than the vision problems rest of neurological exam is normal.  Psychiatric: She does have slight sadness but not thomas.  Encouragement is provided.        Assessment and Plan:  1. 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 female who is here post- hospitalization follow up for acute cerebrovascular infarction of right middle cerebral artery.  She is continuing to improve dose slowly.  She will continue with home health visits for nursing care as well as physical therapy.  I did ask for an inclusion of right upper extremity physical therapy.  We discussed blood pressure control with her daughter and parameters was given for systolic blood pressure above 159.  She will call us with that number.  Also discussed symptoms to be expected at this point.  Encouraged continuing physical activity though with care, she does have a left side neglect and was encouraged with regards to moving her neck consistently to be able to see the left side.  I encouraged her daughter to continuously encourage her.  Blood pressure will be monitored closely.    I spent 40 mins with patient and more than half of that time was spent on counseling ,review of reports and treatment planning.    Yesenia Lundberg MD  7/7/2017

## 2021-06-12 NOTE — PROGRESS NOTES
Assessment:   1. Primary insomnia  Informed patient and her daughter that starting patient on the new sleep aid while she is change in environment is not ideal.  Patient and her daughter were encouraged to try melatonin and follow-up with her PCP or new provider since she moved to [place] for sleep study and evaluation of anxiety and depression.  - melatonin 3 mg Tab tablet; Take 1 tablet (3 mg total) by mouth at bedtime as needed.  Dispense: 30 tablet; Refill: 1     Plan:   Discussed sleep hygiene measures including regular sleep schedule, optimal sleep environment, and relaxing presleep rituals.  Avoid daytime naps.  Avoid caffeine after noon.  Avoid excess alcohol.  Avoid tobacco.  Recommended daily exercise.  Follow up in 2 weeks or sooner if not improving.     Subjective:   Sandy Pennington is a 77 y.o. female who presented to the clinic with her daughter.  Patient's daughter is interpreting and is the historian here.  Patient's daughter complains that her mother has trouble sleeping.  She stated that she had a minor stroke sometime in June and since then she has been having some issues with sleeping at night.. Patient denied having this problem but her daughter says that she is up most of the night talking to herself and when patient was asked patient states that she is praying that whenever she gets a bad dream that she wakes up and she prays.  Patient daughter stated that whenever patient wakes up she does not go back to sleep she does it all the night and her in-laws and granddaughters are all complaining about the same thing.  Patient does state that on September 5 that they will be moving to [place] and she really wants to see if she can get some sleep when she moves . Associated symptoms include: none. Patient denies anxiety, daytime somnolence, depression, fatigue, frequent nighttime urination, irritability, leg cramps, restless legs, snoring and stress. Symptoms have gradually worsened.    The  following portions of the patient's history were reviewed and updated as appropriate:   She  has a past medical history of Arthritis; GERD (gastroesophageal reflux disease); and Kidney stone.  She  does not have any pertinent problems on file.  She  has a past surgical history that includes Appendectomy; Cataract extraction, bilateral; and Cystoscopy w/ ureteral stent placement (Bilateral, 2/10/2017).  Her family history is negative for Urolithiasis, Clotting disorder, Diabetes, Gout, Cancer, and Heart disease.  She  reports that she has never smoked. She has never used smokeless tobacco. She reports that she does not drink alcohol or use illicit drugs.  Current Outpatient Prescriptions   Medication Sig Dispense Refill     aspirin 325 MG tablet Take 1 tablet (325 mg total) by mouth daily. 30 tablet 3     benzonatate (TESSALON) 100 MG capsule Take 1-2 capsules (100-200 mg total) by mouth 3 (three) times a day as needed for cough. 30 capsule 0     DULoxetine (CYMBALTA) 30 MG capsule Take 1 capsule (30 mg total) by mouth daily. 30 capsule 3     lisinopril (PRINIVIL,ZESTRIL) 5 MG tablet Take 1 tablet (5 mg total) by mouth daily. 30 tablet 4     naproxen sodium (ALEVE) 220 MG tablet Take 1 tablet (220 mg total) by mouth 2 (two) times a day as needed for mild pain (1-3) (Headache). 30 tablet 3     simvastatin (ZOCOR) 40 MG tablet Take 1 tablet (40 mg total) by mouth at bedtime. 30 tablet 3     traMADol (ULTRAM) 50 mg tablet Take 1 tablet (50 mg total) by mouth 2 (two) times a day as needed for pain. 60 tablet 0     melatonin 3 mg Tab tablet Take 1 tablet (3 mg total) by mouth at bedtime as needed. 30 tablet 1     No current facility-administered medications for this visit.      Current Outpatient Prescriptions on File Prior to Visit   Medication Sig     aspirin 325 MG tablet Take 1 tablet (325 mg total) by mouth daily.     benzonatate (TESSALON) 100 MG capsule Take 1-2 capsules (100-200 mg total) by mouth 3 (three) times  "a day as needed for cough.     DULoxetine (CYMBALTA) 30 MG capsule Take 1 capsule (30 mg total) by mouth daily.     lisinopril (PRINIVIL,ZESTRIL) 5 MG tablet Take 1 tablet (5 mg total) by mouth daily.     naproxen sodium (ALEVE) 220 MG tablet Take 1 tablet (220 mg total) by mouth 2 (two) times a day as needed for mild pain (1-3) (Headache).     simvastatin (ZOCOR) 40 MG tablet Take 1 tablet (40 mg total) by mouth at bedtime.     traMADol (ULTRAM) 50 mg tablet Take 1 tablet (50 mg total) by mouth 2 (two) times a day as needed for pain.     [] predniSONE (DELTASONE) 20 MG tablet Take 3 tablets (60 mg total) by mouth daily for 4 days.     No current facility-administered medications on file prior to visit.      She has No Known Allergies..    Review of Systems  A 12 point comprehensive review of systems was negative except as noted.      Objective:      /86  Pulse 72  Ht 5' 1\" (1.549 m)  Wt 151 lb 9.6 oz (68.8 kg)  SpO2 98%  BMI 28.64 kg/m2  General appearance: alert, appears stated age and cooperative  Head: Normocephalic, without obvious abnormality, atraumatic  Eyes: conjunctivae/corneas clear. PERRL, EOM's intact. Fundi benign.  Pulses: 2+ and symmetric  Skin: Skin color, texture, turgor normal. No rashes or lesions  Lymph nodes: Cervical, supraclavicular, and axillary nodes normal.  Neurologic: Grossly normal      "

## 2021-06-12 NOTE — PROGRESS NOTES
Assessment/Plan:        Diagnoses and all orders for this visit:    Cervical pain  -     XR Cervical Spine 2 - 3 VWS; Future; Expected date: 8/11/17  -     traMADol (ULTRAM) 50 mg tablet; Take 1 tablet (50 mg total) by mouth 2 (two) times a day as needed for pain.  Dispense: 60 tablet; Refill: 0    Hip arthritis  -     XR Hip Right 2 or More VWS; Future; Expected date: 8/11/17    Reactive depression  I reviewed with the daughter the problems with her site.  Unfortunately the ophthalmologist did not think that she needs glasses.  I encouraged him to keep getting up and around.  I encouraged her to make sure to get up and move around and eat and also be active.  She does not have to go outside to work but can walk within the premises.  I think that the swelling is as a result of the arthritis both in the hip as well as in the mornings.  I will have them get an x-ray of the hip as well as x-ray of the neck to try to evaluate the severity.  I did encourage her to use naproxen but make sure that she is eating when she takes the medication.  I did add tramadol as well as Cymbalta.  Cymbalta hopefully to help with mood as well as help with the pain.  Encouraged to the  daughter to sleep with the Cymbalta and the tramadol timing.  With the x-ray I will consider physical therapy.  They will also let me know how she is doing with her knees for possible x-ray and may be steroid shots.        Subjective:    Patient ID: Sandy Pennington is a 77 y.o. female.    HPI Comments: Sandy Pennington 77 years old female coming in today with her daughter with concerns of having right lower extremity swelling that was noted last week.  She noted some discomfort and pain to the right hip area as well as having some knee pain also.  She has been complaining of having any neck pain with bilateral shoulder pain as well as multiple areas of muscle aches and pain.  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  She is noticing very well through the left eye.  She had seen the ophthalmologist and they did not think that there is anything more that he can do.  She is currently on lisinopril aspirin with simvastatin.  She has also been complaining of having headaches intermittently.  Headache is said to respond to naproxen.  Pain to the shoulders as well as the rest of the body also response to naproxen and the doctor has been giving that.  She is also appearing to be a lot more thomas and not wanting to engage in physical activities.  She is said to finish eating and lie around.   also noted that she has not really been eating very well the patient disagrees with that.  She is noted to have eating only once 2 days ago and did not really eat yesterday.  There is no notable crying spells.  But the doctor has been gotten to counseling her regards to the fact that she had a stroke which could be worse.  Pain in the right hip is located in the front and is said to be achy with no radiation.      The following portions of the patient's history were reviewed and updated as appropriate: allergies, current medications, past family history, past medical history, past social history, past surgical history and problem list.    Review of Systems   Constitutional: Negative for activity change, fatigue and fever.   Respiratory: Negative for cough, chest tightness and wheezing.    Cardiovascular: Positive for leg swelling. Negative for chest pain and palpitations.   Gastrointestinal: Negative for constipation, diarrhea and nausea.   Genitourinary: Negative.    Musculoskeletal: Positive for back pain, joint swelling, neck pain and neck stiffness.   Neurological: Positive for headaches.   Psychiatric/Behavioral: Positive for dysphoric mood.           Vitals:    08/11/17 0953   BP: 126/82   Pulse: 64   Weight: 151 lb 6.4 oz (68.7 kg)         Objective:    Physical Exam   Constitutional: She  appears well-developed and well-nourished.   He has good eye contact she is alert she is oriented.  She is afebrile she is not pale.   HENT:   Mouth/Throat: Oropharynx is clear and moist.   Eyes: Pupils are equal, round, and reactive to light.   Neck: No thyromegaly present.   Cardiovascular: Normal rate and regular rhythm.    Pulmonary/Chest: Effort normal and breath sounds normal.   Abdominal: Soft.   Musculoskeletal:        Right hip: She exhibits decreased range of motion and tenderness.        Cervical back: She exhibits tenderness and spasm.   Lymphadenopathy:     She has no cervical adenopathy.   Psychiatric: Her mood appears not anxious. She does not exhibit a depressed mood.   She does look down.  It appears not to be concentrating and replies no answers questions when she is alerted to the question by her daughter.  Though she answers correctly.  Symptoms appears to be absent in her mind.  She does seem thomas.

## 2021-06-15 PROBLEM — N20.1 CALCULUS OF URETER: Status: ACTIVE | Noted: 2017-01-27

## 2021-06-15 PROBLEM — N20.0 CALCULUS OF KIDNEY: Status: ACTIVE | Noted: 2017-01-27

## 2021-06-15 PROBLEM — N20.9 URINARY CALCULUS, UNSPECIFIED: Status: ACTIVE | Noted: 2017-01-27

## 2021-06-16 PROBLEM — I63.511 CEREBRAL INFARCTION INVOLVING RIGHT MIDDLE CEREBRAL ARTERY (H): Status: ACTIVE | Noted: 2017-06-29

## 2021-06-16 PROBLEM — H53.9 VISUAL CHANGES: Status: ACTIVE | Noted: 2017-06-29

## 2021-06-16 PROBLEM — E78.5 HYPERLIPIDEMIA: Status: ACTIVE | Noted: 2017-06-30
